# Patient Record
Sex: MALE | Race: WHITE | NOT HISPANIC OR LATINO | Employment: FULL TIME | URBAN - METROPOLITAN AREA
[De-identification: names, ages, dates, MRNs, and addresses within clinical notes are randomized per-mention and may not be internally consistent; named-entity substitution may affect disease eponyms.]

---

## 2022-03-28 ENCOUNTER — TRANSFERRED RECORDS (OUTPATIENT)
Dept: HEALTH INFORMATION MANAGEMENT | Facility: CLINIC | Age: 37
End: 2022-03-28

## 2022-08-22 ENCOUNTER — TRANSFERRED RECORDS (OUTPATIENT)
Dept: HEALTH INFORMATION MANAGEMENT | Facility: CLINIC | Age: 37
End: 2022-08-22

## 2022-10-27 ENCOUNTER — TRANSCRIBE ORDERS (OUTPATIENT)
Dept: OTHER | Age: 37
End: 2022-10-27

## 2022-10-27 DIAGNOSIS — G43.709 CHRONIC MIGRAINE WITHOUT AURA, NOT INTRACTABLE, WITHOUT STATUS MIGRAINOSUS: Primary | ICD-10-CM

## 2023-01-22 NOTE — TELEPHONE ENCOUNTER
FUTURE VISIT INFORMATION      FUTURE VISIT INFORMATION:    Date: 4/17/2023    Time: 230pm    Location: [facility]  REFERRAL INFORMATION:    Referring provider:  Dr. Crooks     Referring providers clinic:  [facility]     Reason for visit/diagnosis  Migraines     RECORDS REQUESTED FROM:       Clinic name Comments Records Status Imaging Status   [facility]   Requested  Requested          [facility] Everywhere Requested

## 2023-04-17 ENCOUNTER — PRE VISIT (OUTPATIENT)
Dept: NEUROLOGY | Facility: CLINIC | Age: 38
End: 2023-04-17

## 2023-04-21 ASSESSMENT — HEADACHE IMPACT TEST (HIT 6)
HOW OFTEN HAVE YOU FELT TOO TIRED TO WORK BECAUSE OF YOUR HEADACHES: ALWAYS
HOW OFTEN HAVE YOU FELT FED UP OR IRRITATED BECAUSE OF YOUR HEADACHES: VERY OFTEN
HOW OFTEN DID HEADACHS LIMIT CONCENTRATION ON WORK OR DAILY ACTIVITY: SOMETIMES
WHEN YOU HAVE HEADACHES HOW OFTEN IS THE PAIN SEVERE: ALWAYS
HIT6 TOTAL SCORE: 70
WHEN YOU HAVE A HEADACHE HOW OFTEN DO YOU WISH YOU COULD LIE DOWN: ALWAYS
HOW OFTEN DO HEADACHES LIMIT YOUR DAILY ACTIVITIES: SOMETIMES

## 2023-04-21 ASSESSMENT — PATIENT HEALTH QUESTIONNAIRE - PHQ9
SUM OF ALL RESPONSES TO PHQ QUESTIONS 1-9: 12
SUM OF ALL RESPONSES TO PHQ QUESTIONS 1-9: 12
10. IF YOU CHECKED OFF ANY PROBLEMS, HOW DIFFICULT HAVE THESE PROBLEMS MADE IT FOR YOU TO DO YOUR WORK, TAKE CARE OF THINGS AT HOME, OR GET ALONG WITH OTHER PEOPLE: SOMEWHAT DIFFICULT

## 2023-04-25 ENCOUNTER — OFFICE VISIT (OUTPATIENT)
Dept: NEUROLOGY | Facility: CLINIC | Age: 38
End: 2023-04-25
Payer: COMMERCIAL

## 2023-04-25 VITALS — OXYGEN SATURATION: 97 % | SYSTOLIC BLOOD PRESSURE: 143 MMHG | DIASTOLIC BLOOD PRESSURE: 74 MMHG | HEART RATE: 62 BPM

## 2023-04-25 DIAGNOSIS — G43.709 CHRONIC MIGRAINE WITHOUT AURA WITHOUT STATUS MIGRAINOSUS, NOT INTRACTABLE: Primary | ICD-10-CM

## 2023-04-25 DIAGNOSIS — R13.10 DIFFICULTY SWALLOWING PILLS: ICD-10-CM

## 2023-04-25 PROCEDURE — 99205 OFFICE O/P NEW HI 60 MIN: CPT

## 2023-04-25 RX ORDER — ACETAMINOPHEN 160 MG/5ML
LIQUID ORAL
COMMUNITY

## 2023-04-25 ASSESSMENT — PAIN SCALES - GENERAL: PAINLEVEL: SEVERE PAIN (7)

## 2023-04-25 NOTE — LETTER
4/25/2023         RE: Zafar Leiva  [address]  [place] [address]        Dear Colleague,    Thank you for referring your patient, Zafar Leiva, to the [facility] NEUROLOGY CLINICS [facility]. Please see a copy of my visit note below.    [facility]   Headache Neurology Consult    April 25, 2023     Zafar Leiva MRN# [id number]   YOB: 1985 Age: 37 year old     Referring provider: Provider Not In System          Assessment and Recommendations:     Zafar Leiva is a 37 year old male who presents for further evaluation of headaches.    His headache presentation meets criteria for chronic migraine without aura, though he does have short lasting bursts of severe headache like a trigeminal autonomic cephalgia.  He likely has headaches on a genetic basis.  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  He is unable to swallow oral medications in tablet or capsule form.    His neurologic examination is intact today.  I am unable to review any of his prior brain imaging, with the exception of the written report from a head CT from 2020 which reported postsurgical changes though no acute intracranial abnormalities.  We will work to obtain records and images from [facility].  He does note he has an upcoming visit at the [facility]. regardless, I do not recommend further evaluation for secondary causes of headache today.      Considering the short-lived nature of his acute severe headache attacks, our treatment focus will be on headache prophylaxis.    He is currently using magnesium and riboflavin supplementation, though without significant headache benefit.  He has previously tried and failed amitriptyline, topiramate, gabapentin, pregabalin, Botox, Emgality, Aimovig, Ajovy, occipital nerve blocks, chiropractic therapy, massage therapy, acupuncture, and dry needling.  All medications were  discontinued due to side effects or after an adequate treatment trial. His medication options are limited by his inability to swallow tablets or capsules.    I will look into, and review with our pharmacist, which medications could be safely crushed, used in sprinkle form, or come in liquid form.    Additionally, we discussed FDA approved headache devices which could be used for both headache prevention and acute treatment.  He has [facility] insurance, so I recommend Nerivio.  This can be used for 45-minute sessions every other day for headache prevention.  I can certainly prescribe this today, however he may need it prescribed through his VA provider in order to get coverage.  I can provide him with instructions for this if needed.    I will reach out to him via phone call or [employer] message regarding medication options we could consider and will determine appropriate follow-up at that time.    I spent 64 minutes on the day of the encounter on chart review, patient care, and documentation.    Chiqui Adair PA-C  Headache Neurology  [facility] Neurology [facility]            Chief Complaint:     Chief Complaint   Patient presents with     Headache     Chronic migraine - Med Rec in [place] - rescheduled per Pt wife 4/17           History is obtained from the patient and medical record.      Zafra Leiva is a 37 year old male who presents for further evaluation of headaches.    He has had headaches since 2008.  In 2017, he had resection of an ependymoma which was located at the junction of the brainstem and spinal cord.  Following this, he had resolution of his headaches for about 1 to 1-1/2 years, then headaches returned.  These headaches were similar to his prior headaches.    He describes his headaches as located above the eyes or in the frontal region.  It feels like daggers or stabbing the back of his eyes or can be a bitemporal pressure.  He denies any nausea but can be photophobic, phonophobic, osmophobic.  He  "can feel lightheaded, \"woozy\" and have decreased concentration.  He denies any typical aura or associated vision changes, focal paresthesias or weakness, unilateral autonomic features, fevers or illness.    He has some level of headache every day, and can experience brief moments throughout the day where headache becomes more severe.  Severe headache occurs 4-8 times per day, lasts 1 to 2 minutes at most, though typically lasts 30 seconds.  He rates his baseline headache as a 4-6/10, and severe headache as a 7-8+/10 for pain.    Headache is worse with routine physical activity, but he denies any positional or exertional component.    He notes that pressure changes (altitude or barometric), bright lights, and loud sounds can trigger headache.    He has previously tried over-the-counter medications, such as acetaminophen, ibuprofen, Excedrin, though these are not helpful for the headache.  He has previously tried sumatriptan hand which was not effective.    For headache prophylaxis, he has tried amitriptyline, topiramate, gabapentin, pregabalin, botulinum toxin injections, Emgality, Aimovig, Ajovy, and occipital nerve blocks. These were either not tolerated or not effective.     He notes he is not able to swallow pills. He has a history of narrowed esophagus which has been dilated before, but he continues to have issues with swallowing. He Is able to take medications by mouth if crushed, sprinkles, or liquid forms.     He does have difficulty falling asleep and staying asleep.  Headaches do not wake him from sleep.  He notes he possibly has a history of sleep apnea, but was unable to complete his sleep study in the past as he did not tolerate testing equipment and did not ever fall asleep.    He drinks caffeinated soda during the day.    He denies any history of head injuries, besides his prior surgery in 2017.    His mother and sister have headaches.    He last had an eye exam in August, though this was not a dilated " "eye exam.    He also notes that he can occasionally have a soft lump formed at the base of the skull on the right side.  This is movable.  It can be tender with palpation, and pressing on it can provoke headache.  This comes and goes.    He also has some occasional neck pain or muscle tension following his surgery.    Current headache treatments:  Acute therapies     Preventative therapies:  - Magnesium  - Riboflavin    Supportive therapies:     Previous treatments tried:  Acute therapies:  - Acetaminophen  - Ibuprofen  - Excedrin  - Sumatriptan    Preventative therapies:  - Amitriptyline - Drowsy, not effective  - Topiramate - Cognitive slowing, \"vegetable\" for a whole weekend after 1-2 doses, not effective   - Gabapentin - Drowsy, not effective   - Pregabalin - Not effective  - Botox - Tried for 3-4 times, not effective   - Emgality - Tried for 3 months, not effective  - Aimovig - Tried for 3 months, not effective   - Ajovy - Tried for 3 months, not effective   - Occipital nerve blocks - not effective    Supportive therapies:  - Chiropractic therapy  - Massage therapy  - Acupuncture  - Dry needling               Past Medical History:   No past medical history on file.    No history of hypertension or heart disease. No history of asthma or kidney stones.   Denies any mood disturbance.        Past Surgical History:   No past surgical history on file.  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   Right knee arthroscopy       Social History:     He is  and has 4 children (ages ranging from 18 to 10)  He currently works as a     He maybe will miss 1-2 days of work a month due to headache.    Social History     Socioeconomic History     Marital status:      Spouse name: Not on file     Number of children: Not on file     Years of education: Not on file     Highest education level: Not on file   Occupational History     Not on file   Tobacco Use     Smoking status: " Not on file     Smokeless tobacco: Not on file   Substance and Sexual Activity     Alcohol use: Not on file     Drug use: Not on file     Sexual activity: Not on file   Other Topics Concern     Not on file   Social History Narrative     Not on file     Social Determinants of Health     Financial Resource Strain: Not on file   Food Insecurity: Not on file   Transportation Needs: Not on file   Physical Activity: Not on file   Stress: Not on file   Social Connections: Not on file   Intimate Partner Violence: Not on file   Housing Stability: Not on file             Family History:   No family history on file.          Allergies:    Not on File          Medications:   No current outpatient medications on file.          Physical Exam:   There were no vitals taken for this visit.     General: In no acute distress.  Head: Normocephalic, atraumatic. No radiating pain with palpation over the supraorbital notches, occipital nerves, though there is some tenderness to palpation at the right occiput. Temporal pulses intact.   Neck: Normal range of motion with lateral head movements and neck flexion.  Eyes: No conjunctival injection, no scleral icterus.     Neurologic Exam:  Mental Status Exam: Alert, awake and oriented to situation. No dysarthria. Speech of normal fluency.  Cranial Nerves: Fundoscopic exam with clear disc margins bilaterally. PERRLA, EOMs intact, no nystagmus, facial movements symmetric, facial sensation intact to light touch, hearing intact to conversation, trapezius and SCMs 5/5 bilaterally, tongue midline and fully mobile. No tongue atrophy or fasciculations.   Motor: Normal tone in all four extremities, no atrophy or fasciculations. 5/5 strength bilaterally in shoulder abduction, elbow flexion and extension, wrist flexion and extension, hip flexion, knee flexion and extension, dorsiflexion and plantarflexion. No tremors or abnormal movements noted.  Sensory: Sensation intact to light touch on arms  bilaterally.  Coordination: Finger-nose-finger intact bilaterally. Rapidly alternating movements intact bilaterally in the upper extremities. Normal finger tapping bilaterally. Normal Romberg.  Reflexes: 2+ and symmetric in triceps, biceps, brachioradialis, patellar, and Achilles. Plantar reflexes are downgoing bilaterally.  Gait: Normal gait. Able to toe and heel walk. Normal tandem gait.            Data:     He has had recent imaging including MRI brain, cervical, thoracic, and lumbar spine ([place]). I am unable to review these records today. We will work to obtain records.     CT head (4/10/2020):  IMPRESSION:   1. No evidence of acute intracranial abnormality.   2. Postoperative changes of suboccipital craniectomy and posterior C1   arch resection with small associated area of decreased attenuation in   the dorsal spinal cord at the C1 level, likely secondary to resection   of previously visualized enhancing intramedullary mass.          Answers for HPI/ROS submitted by the patient on 4/21/2023  If you checked off any problems, how difficult have these problems made it for you to do your work, take care of things at home, or get along with other people?: Somewhat difficult  PHQ9 TOTAL SCORE: 12          Again, thank you for allowing me to participate in the care of your patient.        Sincerely,        ERMA AMOS PA-C

## 2023-04-25 NOTE — PROGRESS NOTES
[facility]   Headache Neurology Consult    April 25, 2023     Zafar Leiva MRN# [id number]   YOB: 1985 Age: 37 year old     Referring provider: Provider Not In System          Assessment and Recommendations:     Zafar Leiva is a 37 year old male who presents for further evaluation of headaches.    His headache presentation meets criteria for chronic migraine without aura, though he does have short lasting bursts of severe headache like a trigeminal autonomic cephalgia.  He likely has headaches on a genetic basis.  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  He is unable to swallow oral medications in tablet or capsule form.    His neurologic examination is intact today.  I am unable to review any of his prior brain imaging, with the exception of the written report from a head CT from 2020 which reported postsurgical changes though no acute intracranial abnormalities.  We will work to obtain records and images from [facility].  He does note he has an upcoming visit at the [facility]. regardless, I do not recommend further evaluation for secondary causes of headache today.      Considering the short-lived nature of his acute severe headache attacks, our treatment focus will be on headache prophylaxis.    He is currently using magnesium and riboflavin supplementation, though without significant headache benefit.  He has previously tried and failed amitriptyline, topiramate, gabapentin, pregabalin, Botox, Emgality, Aimovig, Ajovy, occipital nerve blocks, chiropractic therapy, massage therapy, acupuncture, and dry needling.  All medications were discontinued due to side effects or after an adequate treatment trial. His medication options are limited by his inability to swallow tablets or capsules.    I will look into, and review with our pharmacist, which medications could be safely crushed, used in sprinkle  "form, or come in liquid form.    Additionally, we discussed FDA approved headache devices which could be used for both headache prevention and acute treatment.  He has VA insurance, so I recommend Nerivio.  This can be used for 45-minute sessions every other day for headache prevention.  I can certainly prescribe this today, however he may need it prescribed through his [facility] provider in order to get coverage.  I can provide him with instructions for this if needed.    I will reach out to him via phone call or [employer] message regarding medication options we could consider and will determine appropriate follow-up at that time.    I spent 64 minutes on the day of the encounter on chart review, patient care, and documentation.    Chiqui Adair PA-C  Headache Neurology  [facility] Neurology [facility]            Chief Complaint:     Chief Complaint   Patient presents with     Headache     Chronic migraine - Med Rec in [place] - rescheduled per Pt wife 4/17           History is obtained from the patient and medical record.      Zafar Leiva is a 37 year old male who presents for further evaluation of headaches.    He has had headaches since 2008.  In 2017, he had resection of an ependymoma which was located at the junction of the brainstem and spinal cord.  Following this, he had resolution of his headaches for about 1 to 1-1/2 years, then headaches returned.  These headaches were similar to his prior headaches.    He describes his headaches as located above the eyes or in the frontal region.  It feels like daggers or stabbing the back of his eyes or can be a bitemporal pressure.  He denies any nausea but can be photophobic, phonophobic, osmophobic.  He can feel lightheaded, \"woozy\" and have decreased concentration.  He denies any typical aura or associated vision changes, focal paresthesias or weakness, unilateral autonomic features, fevers or illness.    He has some level of headache every day, and can experience " brief moments throughout the day where headache becomes more severe.  Severe headache occurs 4-8 times per day, lasts 1 to 2 minutes at most, though typically lasts 30 seconds.  He rates his baseline headache as a 4-6/10, and severe headache as a 7-8+/10 for pain.    Headache is worse with routine physical activity, but he denies any positional or exertional component.    He notes that pressure changes (altitude or barometric), bright lights, and loud sounds can trigger headache.    He has previously tried over-the-counter medications, such as acetaminophen, ibuprofen, Excedrin, though these are not helpful for the headache.  He has previously tried sumatriptan hand which was not effective.    For headache prophylaxis, he has tried amitriptyline, topiramate, gabapentin, pregabalin, botulinum toxin injections, Emgality, Aimovig, Ajovy, and occipital nerve blocks. These were either not tolerated or not effective.     He notes he is not able to swallow pills. He has a history of narrowed esophagus which has been dilated before, but he continues to have issues with swallowing. He Is able to take medications by mouth if crushed, sprinkles, or liquid forms.     He does have difficulty falling asleep and staying asleep.  Headaches do not wake him from sleep.  He notes he possibly has a history of sleep apnea, but was unable to complete his sleep study in the past as he did not tolerate testing equipment and did not ever fall asleep.    He drinks caffeinated soda during the day.    He denies any history of head injuries, besides his prior surgery in 2017.    His mother and sister have headaches.    He last had an eye exam in August, though this was not a dilated eye exam.    He also notes that he can occasionally have a soft lump formed at the base of the skull on the right side.  This is movable.  It can be tender with palpation, and pressing on it can provoke headache.  This comes and goes.    He also has some occasional  "neck pain or muscle tension following his surgery.    Current headache treatments:  Acute therapies     Preventative therapies:  - Magnesium  - Riboflavin    Supportive therapies:     Previous treatments tried:  Acute therapies:  - Acetaminophen  - Ibuprofen  - Excedrin  - Sumatriptan    Preventative therapies:  - Amitriptyline - Drowsy, not effective  - Topiramate - Cognitive slowing, \"vegetable\" for a whole weekend after 1-2 doses, not effective   - Gabapentin - Drowsy, not effective   - Pregabalin - Not effective  - Botox - Tried for 3-4 times, not effective   - Emgality - Tried for 3 months, not effective  - Aimovig - Tried for 3 months, not effective   - Ajovy - Tried for 3 months, not effective   - Occipital nerve blocks - not effective    Supportive therapies:  - Chiropractic therapy  - Massage therapy  - Acupuncture  - Dry needling               Past Medical History:   No past medical history on file.    No history of hypertension or heart disease. No history of asthma or kidney stones.   Denies any mood disturbance.        Past Surgical History:   No past surgical history on file.  History of ependymoma WHO grade II, with resection 1/26/2017 at [facility].   Right knee arthroscopy       Social History:     He is  and has 4 children (ages ranging from 18 to 10)  He currently works as a     He maybe will miss 1-2 days of work a month due to headache.    Social History     Socioeconomic History     Marital status:      Spouse name: Not on file     Number of children: Not on file     Years of education: Not on file     Highest education level: Not on file   Occupational History     Not on file   Tobacco Use     Smoking status: Not on file     Smokeless tobacco: Not on file   Substance and Sexual Activity     Alcohol use: Not on file     Drug use: Not on file     Sexual activity: Not on file   Other Topics Concern     Not on file   Social History Narrative     Not on file     Social " Determinants of Health     Financial Resource Strain: Not on file   Food Insecurity: Not on file   Transportation Needs: Not on file   Physical Activity: Not on file   Stress: Not on file   Social Connections: Not on file   Intimate Partner Violence: Not on file   Housing Stability: Not on file             Family History:   No family history on file.          Allergies:    Not on File          Medications:   No current outpatient medications on file.          Physical Exam:   There were no vitals taken for this visit.     General: In no acute distress.  Head: Normocephalic, atraumatic. No radiating pain with palpation over the supraorbital notches, occipital nerves, though there is some tenderness to palpation at the right occiput. Temporal pulses intact.   Neck: Normal range of motion with lateral head movements and neck flexion.  Eyes: No conjunctival injection, no scleral icterus.     Neurologic Exam:  Mental Status Exam: Alert, awake and oriented to situation. No dysarthria. Speech of normal fluency.  Cranial Nerves: Fundoscopic exam with clear disc margins bilaterally. PERRLA, EOMs intact, no nystagmus, facial movements symmetric, facial sensation intact to light touch, hearing intact to conversation, trapezius and SCMs 5/5 bilaterally, tongue midline and fully mobile. No tongue atrophy or fasciculations.   Motor: Normal tone in all four extremities, no atrophy or fasciculations. 5/5 strength bilaterally in shoulder abduction, elbow flexion and extension, wrist flexion and extension, hip flexion, knee flexion and extension, dorsiflexion and plantarflexion. No tremors or abnormal movements noted.  Sensory: Sensation intact to light touch on arms bilaterally.  Coordination: Finger-nose-finger intact bilaterally. Rapidly alternating movements intact bilaterally in the upper extremities. Normal finger tapping bilaterally. Normal Romberg.  Reflexes: 2+ and symmetric in triceps, biceps, brachioradialis, patellar, and  Achilles. Plantar reflexes are downgoing bilaterally.  Gait: Normal gait. Able to toe and heel walk. Normal tandem gait.            Data:     He has had recent imaging including MRI brain, cervical, thoracic, and lumbar spine ([place]). I am unable to review these records today. We will work to obtain records.     CT head (4/10/2020):  IMPRESSION:   1. No evidence of acute intracranial abnormality.   2. Postoperative changes of suboccipital craniectomy and posterior C1   arch resection with small associated area of decreased attenuation in   the dorsal spinal cord at the C1 level, likely secondary to resection   of previously visualized enhancing intramedullary mass.          Answers for HPI/ROS submitted by the patient on 4/21/2023  If you checked off any problems, how difficult have these problems made it for you to do your work, take care of things at home, or get along with other people?: Somewhat difficult  PHQ9 TOTAL SCORE: 12

## 2023-04-25 NOTE — Clinical Note
Gilmer Caputo,  This patient is unable to swallow tablets or capsules.  He is typically able to tolerate things that can be crushed or come in sprinkle form, as well as liquid formulations.  He has tried and failed a few migraine preventatives, including all of the injections and Botox.  Our focus is on headache prevention.  I know that Depakote comes in sprinkles, though would appreciate any additional recommendations. He will also try Nerivio.  Thanks!  Chiqui Adair PA-C

## 2023-04-26 ENCOUNTER — TELEPHONE (OUTPATIENT)
Dept: NEUROLOGY | Facility: CLINIC | Age: 38
End: 2023-04-26

## 2023-04-26 NOTE — TELEPHONE ENCOUNTER
Contacted [place], sent fax to retrieve all recent records. Requested all records from 2023, RIGHTFAX confirmed.      Tracy Cullen MA

## 2023-04-26 NOTE — TELEPHONE ENCOUNTER
Faxed Form/(request records for 2023) April 26, 2023 to fax number [phone]    Right Fax confirmed at 2:13 PM    Tracy Cullen MA

## 2023-04-27 ENCOUNTER — MYC MEDICAL ADVICE (OUTPATIENT)
Dept: NEUROLOGY | Facility: CLINIC | Age: 38
End: 2023-04-27

## 2023-04-27 DIAGNOSIS — G43.709 CHRONIC MIGRAINE WITHOUT AURA WITHOUT STATUS MIGRAINOSUS, NOT INTRACTABLE: Primary | ICD-10-CM

## 2023-04-28 RX ORDER — PROPRANOLOL HYDROCHLORIDE 40 MG/5ML
40 SOLUTION ORAL 3 TIMES DAILY
Qty: 500 ML | Refills: 3 | Status: SHIPPED | OUTPATIENT
Start: 2023-04-28

## 2023-05-02 ENCOUNTER — VIRTUAL VISIT (OUTPATIENT)
Dept: NEUROLOGY | Facility: CLINIC | Age: 38
End: 2023-05-02
Payer: COMMERCIAL

## 2023-05-02 DIAGNOSIS — G43.709 CHRONIC MIGRAINE WITHOUT AURA WITHOUT STATUS MIGRAINOSUS, NOT INTRACTABLE: Primary | ICD-10-CM

## 2023-05-02 DIAGNOSIS — M62.838 MUSCLE SPASM: ICD-10-CM

## 2023-05-02 DIAGNOSIS — G95.9 SPINAL CORD LESION (H): ICD-10-CM

## 2023-05-02 DIAGNOSIS — G62.9 NEUROPATHY: ICD-10-CM

## 2023-05-02 DIAGNOSIS — R20.2 PARESTHESIAS: ICD-10-CM

## 2023-05-02 DIAGNOSIS — N31.9 BLADDER DYSFUNCTION: ICD-10-CM

## 2023-05-02 PROCEDURE — 99215 OFFICE O/P EST HI 40 MIN: CPT | Mod: VID

## 2023-05-02 ASSESSMENT — HEADACHE IMPACT TEST (HIT 6)
HOW OFTEN HAVE YOU FELT FED UP OR IRRITATED BECAUSE OF YOUR HEADACHES: VERY OFTEN
HOW OFTEN DO HEADACHES LIMIT YOUR DAILY ACTIVITIES: SOMETIMES
WHEN YOU HAVE A HEADACHE HOW OFTEN DO YOU WISH YOU COULD LIE DOWN: VERY OFTEN
HOW OFTEN DO HEADACHES LIMIT YOUR DAILY ACTIVITIES: SOMETIMES
HOW OFTEN HAVE YOU FELT TOO TIRED TO WORK BECAUSE OF YOUR HEADACHES: SOMETIMES
WHEN YOU HAVE A HEADACHE HOW OFTEN DO YOU WISH YOU COULD LIE DOWN: VERY OFTEN
HOW OFTEN HAVE YOU FELT FED UP OR IRRITATED BECAUSE OF YOUR HEADACHES: VERY OFTEN
HOW OFTEN HAVE YOU FELT TOO TIRED TO WORK BECAUSE OF YOUR HEADACHES: SOMETIMES
HOW OFTEN DID HEADACHS LIMIT CONCENTRATION ON WORK OR DAILY ACTIVITY: SOMETIMES
WHEN YOU HAVE HEADACHES HOW OFTEN IS THE PAIN SEVERE: VERY OFTEN
HIT6 TOTAL SCORE: 63
HIT6 TOTAL SCORE: 63
WHEN YOU HAVE HEADACHES HOW OFTEN IS THE PAIN SEVERE: VERY OFTEN
HOW OFTEN DID HEADACHS LIMIT CONCENTRATION ON WORK OR DAILY ACTIVITY: SOMETIMES

## 2023-05-02 ASSESSMENT — MIGRAINE DISABILITY ASSESSMENT (MIDAS)
HOW MANY DAYS WAS HOUSEWORK PRODUCTIVITY CUT IN HALF DUE TO HEADACHES: 30
HOW OFTEN WERE SOCIAL ACTIVITIES MISSED DUE TO HEADACHES: 0
HOW MANY DAYS DID YOU NOT DO HOUSEWORK BECAUSE OF HEADACHES: 20
TOTAL SCORE: 75
ON A SCALE FROM 0-10 ON AVERAGE HOW PAINFUL WERE HEADACHES: 6
HOW MANY DAYS DID YOU MISS WORK OR SCHOOL BECAUSE OF HEADACHES: 5
HOW MANY DAYS IN THE PAST 3 MONTHS HAVE YOU HAD A HEADACHE: 90
HOW MANY DAYS WAS YOUR PRODUCTIVITY CUT IN HALF BECAUSE OF HEADACHES: 20

## 2023-05-02 NOTE — Clinical Note
Gilmer Molina, can you please fax records of today's note, and the note from 4/25/2023 to patient's primary provider: Jackelyn Martinez NP (Fax: [phone]) Thanks!

## 2023-05-02 NOTE — PROGRESS NOTES
Virtual Visit Details    Type of service:  Video Visit     Originating Location (pt. Location): Other Hotel in [place]    Distant Location (provider location):  On-site  Platform used for Video Visit: Mindy

## 2023-05-02 NOTE — NURSING NOTE
Is the patient currently in the state of [place]? YES    Visit mode:VIDEO    If the visit is dropped, the patient can be reconnected by: VIDEO VISIT: Text to cell phone: [phone]    Will anyone else be joining the visit? NO      How would you like to obtain your AVS? MyChart    Are changes needed to the allergy or medication list? NO    Reason for visit: Video Visit (Follow up headaches )

## 2023-05-02 NOTE — PROGRESS NOTES
[facility]    Headache Neurology Progress Note    May 2, 2023    Subjective:    Zafar presents for sooner follow-up to discuss additional issues. He was recently seen in clinic 4/25/2023 for evaluation of headaches.    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  He notes that the numbness and tingling in his legs has worsened since his surgery.  At times, he is unable to feel anything, and at other times he finds he is very hypersensitive to even light touch.    He always feels as though he is wearing toe socks, like he can feel in between his toes but not the top to bottom of his toes.  He can feel clumsy or have difficulty walking, notes at times he feels as though he needs to lift up his legs to move them.  The lateral thighs can feel very tight or experiencing burning sensation, like his muscles and skin are ripping off.  He can also experience electrical zings down his legs.    He feels as though his muscles are always tight.  He does multiple therapies to try to relax his muscles, including dry needling, massage, heat application, though notes the effects of these interventions are very temporary.  He does not think he has ever tried muscle relaxants.    He does note some trouble with his bowel and bladder.  He feels as though he has finished relieving either bowel or bladder, but will realize he is continuing to urinate or defecate.  He denies any issues with incontinence.    His last EMG was 2 years ago.    He recently had whole spine imaging in October 2022.  Images were obtained at [facility] in [place], [place].      Objective:    Vitals: There were no vitals taken for this visit.  General: Cooperative, NAD  Neurologic Exam:  Mental Status: Fully alert, attentive and oriented. Speech is clear and fluent.   Cranial Nerves: Facial movements symmetric.   Motor: No abnormal movements.      Pertinent Investigations:    We will work to  obtain images from [facility].    Assessment and Plan:   Zafar is a 37 year old male who presents for sooner follow-up to address neuropathy and other concerns, in addition to his chronic headaches (initial encounter 4/25/2023).  Management of his symptoms is complicated as he is unable to swallow pills.     Since our last visit, we decided to try propranolol oral solution for headache prevention.  He will start with 40 mg (5 mL) taken 3 times daily.  If he is tolerating this dose after 1 to 2 weeks, I would recommend increasing the dose to 80 mg (10 mL) taken 3 times daily.    Other alternatives to consider for headache prevention include Vyepti infusion or Depakote Sprinkle.    He will need to reach out to a [facility] provider in order to obtain Nerivio headache device using [facility] benefits.    Regarding the other concerns we addressed today, it is unfortunate that he is experiencing worsening neuropathy following his neurosurgery.  Considering that his symptoms have progressed so much since surgery, it would be reasonable to explore other contributing causes.  I recommend he obtain an updated EMG study locally.  Screening laboratory testing, including blood glucose level (HgbA1C), serum B12 with methylmalonic acid, and serum protein immunofixation electrophoresis could also be considered.    He endorses some symptoms of muscle spasms, though I did not appreciate any spasticity on his exam the other day.  If he were to continue to experience muscle pain or muscle tightness, could trial baclofen.  This is available in oral solution or oral granules.  This may also be helpful for his neuropathic pain.    For other medications to symptomatically treat his neuropathic pain, he has previously not tolerated amitriptyline, topiramate, gabapentin due to side effects.  Pregabalin previously was not helpful for headache prophylaxis, but could be retrialed for his neuropathic pain, and is available in oral solution.   Duloxetine, carbamazepine, oxcarbazepine could be considered and are also available in sprinkle capsules or oral suspensions.     I encourage him to continue with physical therapy and his other supportive therapies, if able.     I will plan to follow-up with him annually, or as needed.     I spent 40 minutes today on patient care, chart review, and documentation.    Chiqui Adair PA-C  Headache Neurology  [facility]

## 2023-05-03 ENCOUNTER — TELEPHONE (OUTPATIENT)
Dept: NEUROLOGY | Facility: CLINIC | Age: 38
End: 2023-05-03

## 2023-05-03 NOTE — TELEPHONE ENCOUNTER
Faxed Form/office notes May 3, 2023 to fax number   [phone]-patient primary care      Right Fax confirmed at 9:26AM    Tracy Cullen MA

## 2023-05-21 ENCOUNTER — HEALTH MAINTENANCE LETTER (OUTPATIENT)
Age: 38
End: 2023-05-21

## 2023-07-19 ENCOUNTER — MYC MEDICAL ADVICE (OUTPATIENT)
Dept: NEUROLOGY | Facility: CLINIC | Age: 38
End: 2023-07-19

## 2023-07-21 NOTE — TELEPHONE ENCOUNTER
Faxed Form/Nerivio July 21, 2023 to fax number [phone]    Right Fax confirmed at 11:20 AM    Tracy Cullen MA

## 2023-07-25 DIAGNOSIS — G43.709 CHRONIC MIGRAINE WITHOUT AURA WITHOUT STATUS MIGRAINOSUS, NOT INTRACTABLE: Primary | ICD-10-CM

## 2023-07-25 RX ORDER — TENS UNITS AND TENS ELECTRODES
1 COMBINATION PACKAGE (EA) MISCELLANEOUS DAILY PRN
Qty: 1 EACH | Refills: 0
Start: 2023-07-25

## 2023-07-25 NOTE — TELEPHONE ENCOUNTER
Faxed Form/orders July 25, 2023 to fax number [phone]    Right Fax confirmed at 3:46 PM    Tracy Cullen MA

## 2023-07-27 ENCOUNTER — TELEPHONE (OUTPATIENT)
Dept: NEUROLOGY | Facility: CLINIC | Age: 38
End: 2023-07-27

## 2023-07-27 NOTE — TELEPHONE ENCOUNTER
Faxed Form/[employer]/Order-Provider letter for order to [facility]-July 27, 2023 to fax number [phone]       Right Fax confirmed at 9:42 AM    Tracy Cullen MA

## 2023-08-03 NOTE — TELEPHONE ENCOUNTER
Received fax from [facility] requesting additional form to be filled out along with OV notes.     RN faxed form with OV note to [phone].     Form faxed to [facility] to put in pt chart.     Rightfaxed confirmed 8:16 AM.     Misa SHELTON RN, BSN  [facility]

## 2023-08-23 ENCOUNTER — MYC MEDICAL ADVICE (OUTPATIENT)
Dept: NEUROLOGY | Facility: CLINIC | Age: 38
End: 2023-08-23

## 2023-08-31 NOTE — TELEPHONE ENCOUNTER
Faxed Nerivio order form and care plan letter to [facility] in [place].    Faxed  Nerivio order form and care plan letter to [facility] in [facility],  August 31, 2023 to fax number [phone]    Right Fax confirmed at 3:23 PM    Rx from sent [facility] to be scanned in to chart.     Misa Bui RN

## 2024-02-15 ENCOUNTER — MYC MEDICAL ADVICE (OUTPATIENT)
Dept: NEUROLOGY | Facility: CLINIC | Age: 39
End: 2024-02-15

## 2024-02-16 NOTE — TELEPHONE ENCOUNTER
Dr. Alegria signed off on nerivio script.     Script and facesheet faxed to [phone].  Rightfax confirmed 2/16/24 at 11:42 AM.    Misa SHELTON RN, BSN  [facility]

## 2024-07-28 ENCOUNTER — HEALTH MAINTENANCE LETTER (OUTPATIENT)
Age: 39
End: 2024-07-28

## 2025-08-10 ENCOUNTER — HEALTH MAINTENANCE LETTER (OUTPATIENT)
Age: 40
End: 2025-08-10